# Patient Record
Sex: FEMALE | Race: BLACK OR AFRICAN AMERICAN
[De-identification: names, ages, dates, MRNs, and addresses within clinical notes are randomized per-mention and may not be internally consistent; named-entity substitution may affect disease eponyms.]

---

## 2017-03-26 ENCOUNTER — HOSPITAL ENCOUNTER (EMERGENCY)
Dept: HOSPITAL 35 - ER | Age: 47
Discharge: LEFT BEFORE DISCHARGE | End: 2017-03-26
Payer: COMMERCIAL

## 2017-03-26 DIAGNOSIS — Z53.21: Primary | ICD-10-CM

## 2020-12-27 ENCOUNTER — HOSPITAL ENCOUNTER (EMERGENCY)
Dept: HOSPITAL 61 - ER | Age: 50
Discharge: HOME | End: 2020-12-27
Payer: SELF-PAY

## 2020-12-27 VITALS — BODY MASS INDEX: 44.04 KG/M2 | WEIGHT: 290.57 LBS | HEIGHT: 68 IN

## 2020-12-27 VITALS — DIASTOLIC BLOOD PRESSURE: 64 MMHG | SYSTOLIC BLOOD PRESSURE: 124 MMHG

## 2020-12-27 DIAGNOSIS — U07.1: Primary | ICD-10-CM

## 2020-12-27 DIAGNOSIS — R05: ICD-10-CM

## 2020-12-27 DIAGNOSIS — R11.2: ICD-10-CM

## 2020-12-27 DIAGNOSIS — E11.65: ICD-10-CM

## 2020-12-27 DIAGNOSIS — R09.81: ICD-10-CM

## 2020-12-27 DIAGNOSIS — Z85.9: ICD-10-CM

## 2020-12-27 DIAGNOSIS — F17.200: ICD-10-CM

## 2020-12-27 DIAGNOSIS — R10.9: ICD-10-CM

## 2020-12-27 LAB
ALBUMIN SERPL-MCNC: 3 G/DL (ref 3.4–5)
ALBUMIN/GLOB SERPL: 0.8 {RATIO} (ref 1–1.7)
ALP SERPL-CCNC: 76 U/L (ref 46–116)
ALT SERPL-CCNC: 10 U/L (ref 14–59)
ANION GAP SERPL CALC-SCNC: 8 MMOL/L (ref 6–14)
AST SERPL-CCNC: 13 U/L (ref 15–37)
BASOPHILS # BLD AUTO: 0.1 X10^3/UL (ref 0–0.2)
BASOPHILS NFR BLD: 1 % (ref 0–3)
BILIRUB SERPL-MCNC: 0.1 MG/DL (ref 0.2–1)
BUN SERPL-MCNC: 12 MG/DL (ref 7–20)
BUN/CREAT SERPL: 12 (ref 6–20)
CALCIUM SERPL-MCNC: 8.3 MG/DL (ref 8.5–10.1)
CHLORIDE SERPL-SCNC: 104 MMOL/L (ref 98–107)
CO2 SERPL-SCNC: 25 MMOL/L (ref 21–32)
CREAT SERPL-MCNC: 1 MG/DL (ref 0.6–1)
EOSINOPHIL NFR BLD: 0.1 X10^3/UL (ref 0–0.7)
EOSINOPHIL NFR BLD: 1 % (ref 0–3)
ERYTHROCYTE [DISTWIDTH] IN BLOOD BY AUTOMATED COUNT: 15 % (ref 11.5–14.5)
GFR SERPLBLD BASED ON 1.73 SQ M-ARVRAT: 71.3 ML/MIN
GLUCOSE SERPL-MCNC: 482 MG/DL (ref 70–99)
HCT VFR BLD CALC: 36 % (ref 36–47)
HGB BLD-MCNC: 12 G/DL (ref 12–15.5)
LIPASE: 127 U/L (ref 73–393)
LYMPHOCYTES # BLD: 2 X10^3/UL (ref 1–4.8)
LYMPHOCYTES NFR BLD AUTO: 32 % (ref 24–48)
MAGNESIUM SERPL-MCNC: 1.7 MG/DL (ref 1.8–2.4)
MCH RBC QN AUTO: 29 PG (ref 25–35)
MCHC RBC AUTO-ENTMCNC: 33 G/DL (ref 31–37)
MCV RBC AUTO: 88 FL (ref 79–100)
MONO #: 0.4 X10^3/UL (ref 0–1.1)
MONOCYTES NFR BLD: 7 % (ref 0–9)
NEUT #: 3.7 X10^3/UL (ref 1.8–7.7)
NEUTROPHILS NFR BLD AUTO: 60 % (ref 31–73)
PLATELET # BLD AUTO: 208 X10^3/UL (ref 140–400)
POTASSIUM SERPL-SCNC: 3.5 MMOL/L (ref 3.5–5.1)
PREG TEST PT QUAL: NEGATIVE
PROT SERPL-MCNC: 6.6 G/DL (ref 6.4–8.2)
RBC # BLD AUTO: 4.09 X10^6/UL (ref 3.5–5.4)
SODIUM SERPL-SCNC: 137 MMOL/L (ref 136–145)
WBC # BLD AUTO: 6.3 X10^3/UL (ref 4–11)

## 2020-12-27 PROCEDURE — 84703 CHORIONIC GONADOTROPIN ASSAY: CPT

## 2020-12-27 PROCEDURE — 99285 EMERGENCY DEPT VISIT HI MDM: CPT

## 2020-12-27 PROCEDURE — 83690 ASSAY OF LIPASE: CPT

## 2020-12-27 PROCEDURE — 80053 COMPREHEN METABOLIC PANEL: CPT

## 2020-12-27 PROCEDURE — 96375 TX/PRO/DX INJ NEW DRUG ADDON: CPT

## 2020-12-27 PROCEDURE — 85025 COMPLETE CBC W/AUTO DIFF WBC: CPT

## 2020-12-27 PROCEDURE — 74177 CT ABD & PELVIS W/CONTRAST: CPT

## 2020-12-27 PROCEDURE — 71045 X-RAY EXAM CHEST 1 VIEW: CPT

## 2020-12-27 PROCEDURE — 96361 HYDRATE IV INFUSION ADD-ON: CPT

## 2020-12-27 PROCEDURE — 96374 THER/PROPH/DIAG INJ IV PUSH: CPT

## 2020-12-27 PROCEDURE — 36415 COLL VENOUS BLD VENIPUNCTURE: CPT

## 2020-12-27 PROCEDURE — 83735 ASSAY OF MAGNESIUM: CPT

## 2020-12-27 NOTE — RAD
XR CHEST 1V 12/27/2020 3:46 AM



INDICATION: Covid, shortness of air



COMPARISON: None available



TECHNIQUE: Portable frontal view of the chest is provided.



FINDINGS:



The cardiomediastinal silhouette is within normal limits. Lungs are clear.



There are no significant pleural effusions. There is no pulmonary vascular congestion. No pneumothora
x.



No suspicious osseous abnormality.



IMPRESSION:



There is no acute cardiopulmonary process.



Electronically signed by: Akiko Cm MD (12/27/2020 3:58 AM) [facility]

## 2020-12-27 NOTE — RAD
PQRS Compliance Statement:



One or more of the following individualized dose reduction techniques were utilized for this examinat
ion:  

1. Automated exposure control  

2. Adjustment of the mA and/or kV according to patient size  

3. Use of iterative reconstruction technique



CT abdomen/pelvis with contrast 12/27/2020 4:10 AM



INDICATION: Covid, abdominal pain, nausea and vomiting



COMPARISON: None available



TECHNIQUE: Multiple axial CT images of the abdomen and pelvis were obtained after the intravenous adm
inistration of 75 mL Omnipaque 300. Coronal and sagittal reformats are provided.



FINDINGS:



Minimal groundglass changes identified the right lung base. This may represent subsegmental atelectas
is versus developing infiltrate. Heart size within normal limits. Evaluation of the upper abdomen is 
limited by patient positioning with arms across abdomen. Subtle hypoattenuation within the inferior r
ight hepatic lobe measures 2.0 cm and could represent heterogeneous appearance of the hepatic parench
yma versus underlying hepatic lesion. Spleen, bilateral adrenal glands, pancreas and gallbladder are 
normal in appearance. Abdominal aorta is normal in course and caliber. No pathologically enlarged lym
ph nodes are identified in abdomen and pelvis. Mild to moderate colonic diverticulosis. No bowel obst
ruction or inflammation. Appendix is normal in appearance. There is a cyst in the medial interpolar l
eft kidney measuring 1.8 cm. The kidneys enhance symmetrically. There is no suspicious renal mass. Th
ere is no hydronephrosis. There are no suspected calculi within the kidneys, ureters or urinary bladd
er. Urinary bladder is within normal limits given degree of distention. Uterus and adnexa are normal 
by CT. No suspicious osseous abnormality is identified.



IMPRESSION:

1. No acute abnormality is identified in abdomen and pelvis.

2. Hypoattenuation noted within the inferior right hepatic lobe measuring 2.0 cm. This could represen
t underlying hepatic lesion. Further characterization with abdominal MRI with and without contrast co
uld be of benefit.

3. Diverticulosis without adjacent inflammatory changes.

4. Faint groundglass changes identified at the right lung base. Consideration may be given for subseg
mental atelectasis versus developing infiltrate.



Electronically signed by: Akiko Cm MD (12/27/2020 4:47 AM) [facility]

## 2020-12-27 NOTE — PHYS DOC
Past Medical History


Past Medical History:  Cancer ("THROAT"), Diabetes-Type II


Smoking Status:  Current Some Day Smoker





General Adult


EDM:


Chief Complaint:  SHORTNESS OF BREATH





HPI:


HPI:





Patient is a 49  year old female who arrives via EMS with a chief complaint of 

nausea vomiting.  States that she was recently diagnosed with COVID-19 and has 

had some nasal congestion with a cough.  Patient also has some shortness of 

breath.  Patient denies any fever.  Patient states that she began having nausea 

vomiting and arrives EMS for evaluation.  Patient is very tangential thinking 

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 

psychiatric patient.  Patient appears grandiose and is extremely tangential in 

her thought process making history and physical and review of systems extremely 

difficult to ascertain the timeframe of her illness.





Review of Systems:


Review of Systems:


Constitutional:   Denies fever or chills. []


Eyes:   Denies change in visual acuity. []


HENT: Complains of congestion


Respiratory: Complains of cough and mild shortness of breath


Cardiovascular:   Denies chest pain or edema. [] 


GI:   Complains abdominal pain with nausea vomiting


:  Denies dysuria. [] 


Musculoskeletal: Complains of myalgias


Integument:   Denies rash. [] 


Neurologic:   Denies headache, focal weakness or sensory changes. [] 


Endocrine:   Denies polyuria or polydipsia. [] 


Lymphatic:  Denies swollen glands. [] 


Psychiatric:  Denies depression or anxiety. []





Heart Score:


Risk Factors:


Risk Factors:  DM, Current or recent (<one month) smoker, HTN, HLP, family 

history of CAD, obesity.


Risk Scores:


Score 0 - 3:  2.5% MACE over next 6 weeks - Discharge Home


Score 4 - 6:  20.3% MACE over next 6 weeks - Admit for Clinical Observation


Score 7 - 10:  72.7% MACE over next 6 weeks - Early Invasive Strategies





Current Medications:





Current Medications








 Medications


  (Trade)  Dose


 Ordered  Sig/Tori  Start Time


 Stop Time Status Last Admin


Dose Admin


 


 Ondansetron HCl


  (Zofran)  4 mg  1X  ONCE  20 03:00


 20 03:01 UNV  





 


 Sodium Chloride  1,000 ml @ 


 1,000 mls/hr  1X  ONCE  20 03:00


 20 03:59 UNV  














Physical Exam:


PE:





Constitutional: Well developed, well nourished, no acute distress, non-toxic 

appearance. []


HENT: Normocephalic, atraumatic, bilateral external ears normal, no trismus, 

nose normal. []


Eyes: PERRLA, EOMI, conjunctiva normal, no discharge. [] 


Neck: Normal range of motion, no tenderness, supple, no stridor. [] 


Cardiovascular:Heart rate regular rhythm, peripheral pulse intact cap refill is 

2 seconds


Lungs & Thorax:  Bilateral breath sounds clear, no respiratory distress


Abdomen: Soft with diffuse tenderness without guarding or rebound no masses, no 

pulsatile masses. [] 


Skin: Warm, dry, no erythema, no rash. [] 


Back: No tenderness, no CVA tenderness. [] 


Extremities: No tenderness, no cyanosis, no clubbing, ROM intact, no edema. [] 


Neurologic: Alert and oriented X 3, normal motor function, normal sensory 

function, no focal deficits noted. []


Psychologic: Bizarre affect, tangential thought process, intermittently 

explosive to nursing





Current Patient Data:


Labs:





Laboratory Tests








Test


 20


03:25


 


White Blood Count 6.3 x10^3/uL 


 


Red Blood Count 4.09 x10^6/uL 


 


Hemoglobin 12.0 g/dL 


 


Hematocrit 36.0 % 


 


Mean Corpuscular Volume 88 fL 


 


Mean Corpuscular Hemoglobin 29 pg 


 


Mean Corpuscular Hemoglobin


Concent 33 g/dL 





 


Red Cell Distribution Width 15.0 % 


 


Platelet Count 208 x10^3/uL 


 


Neutrophils (%) (Auto) 60 % 


 


Lymphocytes (%) (Auto) 32 % 


 


Monocytes (%) (Auto) 7 % 


 


Eosinophils (%) (Auto) 1 % 


 


Basophils (%) (Auto) 1 % 


 


Neutrophils # (Auto) 3.7 x10^3/uL 


 


Lymphocytes # (Auto) 2.0 x10^3/uL 


 


Monocytes # (Auto) 0.4 x10^3/uL 


 


Eosinophils # (Auto) 0.1 x10^3/uL 


 


Basophils # (Auto) 0.1 x10^3/uL 


 


Sodium Level 137 mmol/L 


 


Potassium Level 3.5 mmol/L 


 


Chloride Level 104 mmol/L 


 


Carbon Dioxide Level 25 mmol/L 


 


Anion Gap 8 


 


Blood Urea Nitrogen 12 mg/dL 


 


Creatinine 1.0 mg/dL 


 


Estimated GFR


(Cockcroft-Gault) 71.3 





 


BUN/Creatinine Ratio 12 


 


Glucose Level 482 mg/dL 


 


Calcium Level 8.3 mg/dL 


 


Magnesium Level 1.7 mg/dL 


 


Total Bilirubin 0.1 mg/dL 


 


Aspartate Amino Transf


(AST/SGOT) 13 U/L 





 


Alanine Aminotransferase


(ALT/SGPT) 10 U/L 





 


Alkaline Phosphatase 76 U/L 


 


Total Protein 6.6 g/dL 


 


Albumin 3.0 g/dL 


 


Albumin/Globulin Ratio 0.8 


 


Lipase 127 U/L 


 


Serum Pregnancy Test,


Qualitative Negative 











Current Medications








 Medications


  (Trade)  Dose


 Ordered  Sig/Tori


 Route


 PRN Reason  Start Time


 Stop Time Status Last Admin


Dose Admin


 


 Ondansetron HCl


  (Zofran)  4 mg  1X  ONCE


 IVP


   20 03:00


 20 03:01 DC 20 03:00





 


 Sodium Chloride  1,000 ml @ 


 1,000 mls/hr  1X  ONCE


 IV


   20 03:00


 20 03:59 DC 20 03:00





 


 Iohexol


  (Omnipaque 300


 Mg/ml)  75 ml  1X  ONCE


 IV


   20 04:30


 20 04:31 DC 20 04:38





 


 Info


  (CONTRAST GIVEN


 -- Rx MONITORING)  1 each  PRN DAILY  PRN


 [facility]


 SEE COMMENTS  20 04:30


 20 04:29   





 


 Insulin Human


 Regular


  (HumuLIN R VIAL)  10 unit  1X  ONCE


 IV


   20 04:45


 20 04:46 DC  





 


 Potassium Chloride


  (Klor-Con)  40 meq  1X  ONCE


 PO


   20 04:45


 20 04:46 DC  





 


 Iohexol


  (Omnipaque 300


 Mg/ml)  75 ml  1X  ONCE


 IV


   20 04:45


 20 04:46 DC  





 


 Info


  (CONTRAST GIVEN


 -- Rx MONITORING)  1 each  PRN DAILY  PRN


 MC


 SEE COMMENTS  20 04:45


 20 04:44   











Vital Signs:





Vital Signs








  Date Time  Temp Pulse Resp B/P (MAP) Pulse Ox O2 Delivery O2 Flow Rate FiO2


 


20 03:00 98.6 60 18 158/75 (102) 98 Room Air  





 98.6       











EKG:


EKG:


EKG interpreted by me sinus bradycardia with a rate of 58 normal axis normal, 

nonspecific ST changes []





Radiology/Procedures:


Radiology/Procedures:


[][facility]


                    [address] [facility] [place] [address]


                                 [phone]


                                        


                                 IMAGING REPORT





                                     Signed





PATIENT: SHERON YORKCOUNT: [id number]     MRN#: [id number]


: 1970           LOCATION: ER              AGE: 49


SEX: F                    EXAM DT: 20         ACCESSION#: [id number]


STATUS: REG ER            ORD. PHYSICIAN: TOMMY CHOWDHURY MD


REASON: COVID, ABD PAIN, N/V, ALSO HX OF DIVERTICULITIS


PROCEDURE: CT ABD PELV W/ IV CONTRST ONLY





PQRS Compliance Statement:





One or more of the following individualized dose reduction techniques were 

utilized for this examination:  


1. Automated exposure control  


2. Adjustment of the mA and/or kV according to patient size  


3. Use of iterative reconstruction technique





CT abdomen/pelvis with contrast 2020 4:10 AM





INDICATION: Covid, abdominal pain, nausea and vomiting





COMPARISON: None available





TECHNIQUE: Multiple axial CT images of the abdomen and pelvis were obtained 

after the intravenous administration of 75 mL Omnipaque 300. Coronal and s

agittal reformats are provided.





FINDINGS:





Minimal groundglass changes identified the right lung base. This may represent 

subsegmental atelectasis versus developing infiltrate. Heart size within normal 

limits. Evaluation of the upper abdomen is limited by patient positioning with 

arms across abdomen. Subtle hypoattenuation within the inferior right hepatic 

lobe measures 2.0 cm and could represent heterogeneous appearance of the hepatic

 parenchyma versus underlying hepatic lesion. Spleen, bilateral adrenal glands, 

pancreas and gallbladder are normal in appearance. Abdominal aorta is normal in 

course and caliber. No pathologically enlarged lymph nodes are identified in 

abdomen and pelvis. Mild to moderate colonic diverticulosis. No bowel 

obstruction or inflammation. Appendix is normal in appearance. There is a cyst 

in the medial interpolar left kidney measuring 1.8 cm. The kidneys enhance 

symmetrically. There is no suspicious renal mass. There is no hydronephrosis. 

There are no suspected calculi within the kidneys, ureters or urinary bladder. 

Urinary bladder is within normal limits given degree of distention. Uterus and 

adnexa are normal by CT. No suspicious osseous abnormality is identified.





IMPRESSION:


1. No acute abnormality is identified in abdomen and pelvis.


2. Hypoattenuation noted within the inferior right hepatic lobe measuring 2.0 

cm. This could represent underlying hepatic lesion. Further characterization 

with abdominal MRI with and without contrast could be of benefit.


3. Diverticulosis without adjacent inflammatory changes.


4. Faint groundglass changes identified at the right lung base. Consideration 

may be given for subsegmental atelectasis versus developing infiltrate.





Electronically signed by: Juliane Cm MD (2020 4:47 AM) [facility]














DICTATED and SIGNED BY:     JULIANE CM MD


DATE:     20 [id number]


                            [facility]


                    [address] [place] [address]


                                 [phone]


                                        


                                 IMAGING REPORT





                                     Signed





PATIENT: SHERON YORK MACCOUNT: [id number]     MRN#: [id number]


: 1970           LOCATION: ER              AGE: 49


SEX: F                    EXAM DT: 20         ACCESSION#: [id number]


STATUS: REG ER            ORD. PHYSICIAN: TOMMY CHOWDHURY MD


REASON:   COVID, SOA,            ER#11


PROCEDURE: PORTABLE CHEST 1V





XR CHEST 1V 2020 3:46 AM





INDICATION: Covid, shortness of air





COMPARISON: None available





TECHNIQUE: Portable frontal view of the chest is provided.





FINDINGS:





The cardiomediastinal silhouette is within normal limits. Lungs are clear.





There are no significant pleural effusions. There is no pulmonary vascular 

congestion. No pneumothorax.





No suspicious osseous abnormality.





IMPRESSION:





There is no acute cardiopulmonary process.





Electronically signed by: Juliane Cm MD (2020 3:58 AM) [facility]














DICTATED and SIGNED BY:     JULIANE CM MD


DATE:     20 [id number] 0





Course & Med Decision Making:


Course & Med Decision Making


Pertinent Labs and Imaging studies reviewed. (See chart for details)





[] 49-year-old female arrives via EMS with a chief complaint nausea vomiting.  

Patient recently got out of [facility] for COVID-19.  Patient 

had abdominal pain with vomiting and history of diverticulosis and a CT was done

 to rule out diverticulitis.  CT is negative for diverticulitis.  Abdominal exam

 is soft and nonsurgical.  Chest x-ray is negative.  CT of the abdomen and 

pelvis revealed some mild groundglass infiltrate consistent with COVID-19.  Pat

ient reassessed at 5:03 AM and resting comfortably.  Patient has no respiratory 

distress.  Patient is found to have hyperglycemic and insulin has been ordered. 

 Patient also has a potassium of 3.5 this will be replaced.  Patient will be 

stable for discharge to the [facility].  Patient given prescription for Zofran.





Dragon Disclaimer:


Dragon Disclaimer:


This electronic medical record was generated, in whole or in part, using a voice

 recognition dictation system.





Departure


Departure


Impression:  


   Primary Impression:  


   COVID-19


   Additional Impressions:  


   Vomiting


   Abdominal pain


   Hyperglycemia


Disposition:  01 DC HOME SELF CARE/HOMELESS


Condition:  STABLE


Referrals:  


PCP


[facility]


[address] [place] [address]


[phone]





[facility]


[address] [place] [address]


[phone]





[facility]


[address]


Patient Instructions:  Hyperglycemia, Nausea and Vomiting





Additional Instructions:  


You have been tested for or diagnosed with COVID-19. It is an infection caused 

by a new type 


of coronavirus. COVID-19 will cause cold-like or mild flu symptoms in most. It 

can cause 


more severe symptoms like problems breathing in some.





There is no treatment for COVID-19. The body will clear the infection over time.

 Self-care 


will help to ease discomfort.





Steps to Take:


Self-Care


Rest as needed. Healthy habits may help you feel better. Steps include:





Choose healthy foods including fruits and vegetables. Drink water throughout the

 day.


Get plenty of sleep each night.


If you smoke, try to quit. It may ease breathing.


Avoid alcohol.


Keep Others Healthy


The virus can spread to others. Droplets are released every time you sneeze or 

cough. The 


droplets can get into the mouth, nose, or eyes of people near you and lead to 

infection. To 


lower the chances of spreading COVID-19 to others:





Stay at home until your doctor has said it is safe to leave. If you tested 

positive this 


will mean staying isolated until both of the following are true:





At least 7 days have passed since the start of illness.


You are free of fever for at least 72 hours without the use of medicine.


During this time:





 - Avoid public areas, events, or transportation. Do not return to work or 

school until your 


doctor has said it is safe to do so.


 - Call ahead if you need to go to a medical center. Let them know you may have 

COVID-19. It 


will help them guide you where to go. They may also ask you to wear a facemask 

when you come 


to the office.


 - If you call for emergency medical services, let them know you may have COVID-

19.


While at home:





 - Try to avoid close contact with others. Stay about 6 feet away.


 - If possible, spend most of your time in a separate room from others.


 - Use a face mask if you will be in close contact with others such as sharing a

 room or 


vehicle.


 - Have someone wipe down common surfaces in the home. Use household  

every day on 


areas like doorknobs, counters, or sinks.


 - Cough or sneeze into a tissue. Throw the tissue away right after use. If a 

tissue is not 


available, cough or sneeze into your elbow.


 - Wash your hands often. Wash them after sneezing or coughing. Use soap and 

water and wash 


for at least 20 seconds. Alcohol based hand  can be used if soap and 

water is not 


available.


 - Do not prepare food for others. Avoid sharing personal items like forks, 

spoons, or 


toothbrushes.


 - Avoid close contact with pets while you are sick. There is no evidence of the

 virus 


passing to pets. This is a safety step until more is known about this virus.


Isolation can be frustrating. Social interaction can help. Keep in touch with 

friends and 


family through phone and tech options. You can still interact with others in 

your home, just 


keep a safe distance of about 6 feet.





Follow-up:


Your doctors office will check in with you to see if there are any changes in 

your health. 


You may be asked to keep track of symptoms to share with them. They will also 

let you know 


when you are clear to be in public again.





Problems to Look Out For:


Contact your doctor if your recovery is not going as you expect. Get emergency 

care if you 


have problems such as:





 - Trouble breathing


 - Nonstop chest pain or pressure


 - Changes in awareness, confusion, or problems waking


 - Lips or face have bluish color


 - Worsening of symptoms


If you think you have an emergency, call for emergency medical services right 

away.





As taken from [facility]


EMERGENCY DEPARTMENT GENERAL DISCHARGE INSTRUCTIONS





THANK YOU for coming to [facility] Emergency Department (ED) 

today and 


trusting us with your care.  We trust that you had a positive experience in our 

Emergency 


Department. If you wish to speak to the department Management you can contact 

the department 


Director at [phone].








YOUR FOLLOW UP INSTRUCTIONS ARE AS FOLLOWS: 





Do you have a private doctor? If you do not have a private doctor, please ask 

for a resource 


list of physicians or clinics that may be able to assist you with follow up c

are.  





The Emergency Physician has interpreted your x-rays. The X-ray specialist will 

also review 


them. If there is a change in the findings you will be notified in 48 hours when

 at all 


possible. 





A lab test or lab culture may have been done, your results will be reviewed and 

you will be 


notified if you need a change in treatment. 








ADDITIONAL INSTRUCTIONS AND INFORMATION 





Your care today has been supervised by a physician who is specially trained in 

emergency 


care. Many problems require more than one evaluation for a complete diagnosis 

and treatment. 


We recommend that you schedule your follow up appointment as recommended to 

ensure complete 


treatment of your illness or injury.  If you are unable to obtain follow up care

 and 


continue to have a problem, or if your condition worsens we recommend that you 

return to the 


ED. 





We are not able to safely determine your condition over the phone nor are we 

able to give 


sound medical advice over the phone. For these safety reasons, if you call for 

medical 


advice we will ask you to come to the ED for further evaluation





If you have any questions regarding these discharge instructions please call the

 ED at [phone].





SAFETY INFORMATION





In the interest of safety, wellness, and injury prevention; we encourage you to 

wear your 


seatbelt, if you smoke; quit smoking, and we encourage your family to use 

protective helmet 


for bicycling and other sporting events that present an increased risk for head 

injury. 





IF YOUR SYMPTOMS WORSEN OR NEW SYMPTOMS DEVELOP, OR YOU HAVE CONCERNS ABOUT YOUR

 CONDITION; 


OR IF YOUR CONDITION WORSENS WHILE YOU ARE WAITING FOR YOUR FOLLOW UP 

APPOINTMENT;  EITHER  


CONTACT YOUR PRIMARY CARE DOCTOR, THE PHYSICIAN WHOSE NAME AND NUMBER YOU WERE 

GIVEN,  OR 


RETURN TO THE  ED IMMEDIATELY.


Scripts


Ondansetron Hcl (ZOFRAN) 4 Mg Tablet


1 TAB PO Q6HRS, #12 TAB


   Prov: TOMMY CHOWDHURY MD         20











TOMMY CHOWDHURY MD              Dec 27, 2020 03:02

## 2021-01-01 ENCOUNTER — HOSPITAL ENCOUNTER (EMERGENCY)
Dept: HOSPITAL 61 - ER | Age: 51
Discharge: HOME | End: 2021-01-01
Payer: SELF-PAY

## 2021-01-01 VITALS — HEIGHT: 65 IN | BODY MASS INDEX: 46.65 KG/M2 | WEIGHT: 279.99 LBS

## 2021-01-01 VITALS — DIASTOLIC BLOOD PRESSURE: 83 MMHG | SYSTOLIC BLOOD PRESSURE: 167 MMHG
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
## 2021-01-01 DIAGNOSIS — E11.9: ICD-10-CM

## 2021-01-01 DIAGNOSIS — H57.10: ICD-10-CM

## 2021-01-01 DIAGNOSIS — Z87.891: ICD-10-CM

## 2021-01-01 DIAGNOSIS — G89.11: Primary | ICD-10-CM
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
## 2021-01-01 DIAGNOSIS — Z85.9: ICD-10-CM
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
## 2021-01-01 DIAGNOSIS — R51.9: ICD-10-CM

## 2021-01-01 DIAGNOSIS — M54.2: ICD-10-CM

## 2021-01-01 PROCEDURE — 82962 GLUCOSE BLOOD TEST: CPT

## 2021-01-01 PROCEDURE — 70450 CT HEAD/BRAIN W/O DYE: CPT

## 2021-01-01 PROCEDURE — 96372 THER/PROPH/DIAG INJ SC/IM: CPT

## 2021-01-01 PROCEDURE — 99285 EMERGENCY DEPT VISIT HI MDM: CPT

## 2021-01-01 PROCEDURE — 70486 CT MAXILLOFACIAL W/O DYE: CPT
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
